# Patient Record
Sex: FEMALE | Race: WHITE | NOT HISPANIC OR LATINO
[De-identification: names, ages, dates, MRNs, and addresses within clinical notes are randomized per-mention and may not be internally consistent; named-entity substitution may affect disease eponyms.]

---

## 2022-05-17 PROBLEM — Z00.00 ENCOUNTER FOR PREVENTIVE HEALTH EXAMINATION: Status: ACTIVE | Noted: 2022-05-17

## 2022-05-23 ENCOUNTER — NON-APPOINTMENT (OUTPATIENT)
Age: 47
End: 2022-05-23

## 2022-09-19 ENCOUNTER — NON-APPOINTMENT (OUTPATIENT)
Age: 47
End: 2022-09-19

## 2022-09-19 ENCOUNTER — APPOINTMENT (OUTPATIENT)
Dept: OBGYN | Facility: CLINIC | Age: 47
End: 2022-09-19

## 2022-09-19 VITALS
SYSTOLIC BLOOD PRESSURE: 120 MMHG | DIASTOLIC BLOOD PRESSURE: 89 MMHG | BODY MASS INDEX: 26.68 KG/M2 | HEART RATE: 75 BPM | WEIGHT: 170 LBS | HEIGHT: 67 IN

## 2022-09-19 DIAGNOSIS — Z01.419 ENCOUNTER FOR GYNECOLOGICAL EXAMINATION (GENERAL) (ROUTINE) W/OUT ABNORMAL FINDINGS: ICD-10-CM

## 2022-09-19 PROCEDURE — 76830 TRANSVAGINAL US NON-OB: CPT

## 2022-09-19 PROCEDURE — 99396 PREV VISIT EST AGE 40-64: CPT | Mod: 25

## 2022-09-19 NOTE — PROCEDURE
[Transvaginal Ultrasound] : transvaginal ultrasound [FreeTextEntry3] : CERVIX NORMAL\par NO FREE FLUID [FreeTextEntry5] : 122 CC VOL,   8MM.. [id number] CC VOL 3 CM [FreeTextEntry7] : 2.0 CC [FreeTextEntry8] : 1.3 CC

## 2022-09-19 NOTE — HISTORY OF PRESENT ILLNESS
[FreeTextEntry1] : here for ANNUAL\par \par LAST KENIA VISIT:\par \par \par    	Print\par Patient\par Name	TESSY BRIONES (46yo, F) ID# [id number]	Appt. Date/Time	2020 10:00AM\par 	1975	Service Dept.	[facility] OFFICE\par Provider	ELISA RODARTE MD\par Insurance	\par Med Primary: [facility] - PCP ONLY (MEDICAID REPLACEMENT - HMO)\par Insurance # : [id number]\par Prescription: [facility] - Member is eligible. details\par Chief Complaint\par Well Woman Visit\par \par MENORRHAGIA\par LCIS\par ANEMIA\par Patient's Care Team\par Primary Care Provider: ALTAGRACIA MARSHALL MD: [address] [place] [address], Ph [phone], Fax [phone] NPI: [id number]\par Patient's Pharmacies\par [facility] ([facility]): [address] [place] [address], Ph [phone], Fax [phone]\par Vitals\par 2020 10:27 am\par Ht:	5 ft 7 in\par Wt:	181 lbs\par BMI:	28.3\par BP:	120/74 sitting\par Allergies\par Reviewed Allergies\par ALEVE: - possibly rash	\par Medications\par Reviewed Medications\par amLODIPine 5 mg tablet\par 16   filled	[facility]\par amoxicillin 500 mg capsule\par 18   filled	[facility]\par amoxicillin 875 mg-potassium clavulanate 125 mg tablet\par 03/30/15   filled	[facility]\par azithromycin 250 mg tablet\par 12/28/15   filled	[facility]\par benzoyl peroxide 10 % topical cleanser\par 16   filled	[facility]\par ciprofloxacin 500 mg tablet\par Take 1 tablet(s) every 12 hours by oral route for 7 days.\par 14   filled	[facility]\par Cleocin 100 mg vaginal suppository\par Insert 1 suppositor(y/ies) every day by vaginal route at bedtime for 3 days.\par 16   filled	[facility]\par clindamycin  mg capsule\par 16   filled	[facility]\par clindamycin phosphate 1 % topical solution\par 18   filled	[facility]\par cyclobenzaprine 10 mg tablet\par 19   filled	[facility]\par fluconazole 150 mg tablet\par Take 1 tablet(s) by oral route for 1 day.\par 10/13/15   filled	[facility]\par halobetasol propionate 0.05 % topical cream\par 18   filled	[facility]\par halobetasol propionate 0.05 % topical ointment\par 18   filled	[facility]\par hydrOXYzine HCL 10 mg tablet\par 18   filled	[facility]\par hydrOXYzine HCL 25 mg tablet\par 10/22/18   filled	[facility]\par ibuprofen 600 mg tablet\par 18   filled	[facility]\par ibuprofen 800 mg tablet\par 19   filled	[facility]\par ketoconazole 2 % topical cream\par 10/09/18   filled	[facility]\par lorata-dine D 10 mg-240 mg tablet,extended release\par 20   filled	[facility]\par loratadine 10 mg tablet\par 19   filled	[facility]\par minocycline 100 mg capsule\par 16   filled	[facility]\par mometasone 0.1 % topical cream\par 10/22/18   filled	[facility]\par montelukast 10 mg tablet\par 20   filled	[facility]\par nitrofurantoin macrocrystaL 100 mg capsule\par 17   filled	[facility]\par promethazine 6.25 mg/5 mL oral syrup\par 01/07/15   filled	[facility]\par SSD 1 % topical cream\par 08/14/15   filled	[facility]\par Tamiflu 75 mg capsule\par 12/28/15   filled	[facility]\par tobramycin 0.3 %-dexamethasone 0.1 % eye drops,suspension\par 16   filled	[facility]\par triamcinolone acetonide 0.1 % topical cream\par 18   filled	[facility]\par \par claritin D and Flonase\par Problems\par Reviewed Problems\par 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 a day(socially))\par Most Recent Tobacco Use Screenin2020\par Surgical History\par Surgical History not reviewed (last reviewed 2019)\par Appendectomy\par Breast Biopsy - X2\par Tonsillectomy\par GYN History\par Reviewed GYN History\par Duration of Flow (days): 6.\par LMP: Definite.\par Frequency of Cycle (Q days): 28.\par Menses Monthly: Y.\par Flow: Heavy (Notes: CLOTS).\par Date of LMP: 2020.\par Obstetric History\par Reviewed Obstetric History\par TOTAL	FULL	PRE	AB. I	AB. S	ECTOPICS	MULTIPLE	LIVING\par 0							\par DOES NOT WANT KIDS\par Past Medical History\par Past Medical History not reviewed (last reviewed 2019)\par Anemia: Y - IRON DEF\par Notes: LCIS\par Screening\par None recorded.\par HPI\par routine gyn exam/MENORRHAGIA\par \par ROS\par Patient reports abnormal bleeding but reports no hematuria, no flank pain, no trouble urinating, no incontinence, no rash, no lesion, no discharge, no vaginal odor, and no vaginal itching. She reports no fatigue, no fever, no significant weight gain, and no significant weight loss. She reports no abnormal moles and no rashes. She reports no irritation and no vision changes. She reports no hearing loss, no ear pain, no nose/sinus problems, no sore throat, no snoring, no dry mouth, and no mouth ulcers. She reports no dyspnea / shortness of breath, no cough, no sputum production, no hemoptysis, and no wheezing. She reports no chest pain, no palpitations, and no orthopnea. She reports no heartburn, no dysphagia, no nausea, no vomiting, no abdominal pain, no bowel movement changes, no diarrhea, no constipation, and no rectal bleeding. She reports no menstrual problems and no PMDD symptoms. She reports no menopausal symptoms. She reports no sexual problems. She reports no muscle aches, no muscle weakness, no arthralgias/joint pain, and no back pain. She reports no headaches, no dizziness, no LOC, no weakness, no numbness, and no seizures. She reports no depression, no alcoholism, and no sleep disturbances.\par Physical Exam\par Patient is a 44-year-old female.\par \par Chaperone: Chaperone: present.\par \par Female Genitalia: Vulva: no masses, atrophy, or lesions. Bladder/Urethra: no urethral discharge or mass and normal meatus and bladder non distended. Vagina no tenderness, erythema, cystocele, rectocele, abnormal vaginal discharge, or vesicle(s) or ulcers. Cervix: no discharge or cervical motion tenderness and grossly normal. Uterus: midline, mobile, non-tender, normal shape, no uterine prolapse, and enlarged. Adnexa/Parametria: no parametrial tenderness or mass and no adnexal tenderness or ovarian mass.\par \par Breast: Inspection/Palpation: no erythema, induration, tenderness, skin changes, abnormal secretions, or distinct masses and normal nipple appearance and non tender axillary lymph nodes.\par \par Abdomen: Auscultation/Inspection/Palpation: no tenderness, hepatomegaly, splenomegaly, masses, or CVA tenderness and soft, non-distended, and normal bowel sounds. Hernia: none palpated.\par Assessment / Plan\par 1. Gynecologic examination -\par does not want children...re-discussed\par \par Z01.419: Encounter for gynecological examination (general) (routine) without abnormal findings\par PAP, LB\par MAMMO, SCREENING, DIGITAL, BILATERAL\par \par 2. Menorrhagia -\par clots x 2 days\par \par HAD IRON INFUSIONS, RECOMMENDED OFFICE D AND C HYSTEROSCOPY, MIRENA INSERTION\par \par PT TO CONSIDER IT AND CHECK WITH BREAST MD\par \par N92.0: Excessive and frequent menstruation with regular cycle\par US, TRANSVAGINAL\par \par 3. Iron deficiency anemia -\par OBSERVE\par \par D50.9: Iron deficiency anemia, unspecified\par \par 4. Lobular carcinoma in situ of breast -\par HAVING SURGERY NEXT WEEK\par \par D05.01: Lobular carcinoma in situ of right breast\par \par 5. Hypertrophy of uterus -\par OBSERVE\par \par N85.2: Hypertrophy of uterus\par \par US, TRANSVAGINAL\par \par Review of us, transvaginal taken on 2020 at [facility] OFFICE shows:\par Imaging Studies:\par Indications: abnormal bleeding.\par Uterus: volume (cc): 129.\par Endometrium: thickness 10.7 mm.\par Cervix: normal.\par Cul de sac: no fluid was demonstrated.\par Right Ovary: volume (cc): 5.3.\par Left Ovary: volume (cc): 8.4.\par \par Return to Office\par None recorded.\par Encounter Sign-Off\par Encounter signed-off by Elisa Rodarte MD, 2021.\par Encounter performed and documented by Elisa Rodarte MD\par Encounter reviewed & signed by Elisa Rodarte MD on 2021 at 4:07pm\par Audit history\par

## 2022-09-22 LAB — HPV HIGH+LOW RISK DNA PNL CVX: NOT DETECTED

## 2022-09-29 LAB — CYTOLOGY CVX/VAG DOC THIN PREP: ABNORMAL

## 2023-03-13 ENCOUNTER — APPOINTMENT (OUTPATIENT)
Dept: OBGYN | Facility: CLINIC | Age: 48
End: 2023-03-13
Payer: MEDICAID

## 2023-03-13 VITALS
HEIGHT: 67 IN | DIASTOLIC BLOOD PRESSURE: 88 MMHG | BODY MASS INDEX: 28.09 KG/M2 | WEIGHT: 179 LBS | SYSTOLIC BLOOD PRESSURE: 120 MMHG | HEART RATE: 76 BPM

## 2023-03-13 DIAGNOSIS — N85.2 HYPERTROPHY OF UTERUS: ICD-10-CM

## 2023-03-13 DIAGNOSIS — D50.9 IRON DEFICIENCY ANEMIA, UNSPECIFIED: ICD-10-CM

## 2023-03-13 DIAGNOSIS — D05.00 LOBULAR CARCINOMA IN SITU OF UNSPECIFIED BREAST: ICD-10-CM

## 2023-03-13 PROCEDURE — 99213 OFFICE O/P EST LOW 20 MIN: CPT | Mod: 25

## 2023-03-13 PROCEDURE — 76830 TRANSVAGINAL US NON-OB: CPT

## 2023-03-13 NOTE — PHYSICAL EXAM
[No Masses] : no breast masses were palpable [Labia Majora] : normal [Labia Minora] : normal [Normal] : normal [Enlarged ___ wks] : enlarged [unfilled] ~Uweeks [Uterine Adnexae] : normal [Chaperone Present] : A chaperone was present in the examining room during all aspects of the physical examination

## 2023-03-13 NOTE — HISTORY OF PRESENT ILLNESS
[Irregular Menstrual Interval] : irregular menstrual interval [Heavy Bleeding] : heavy bleeding [FreeTextEntry1] : hERE FOR fu of a new fibroid found\par periods have been wacky on and off,,,coming late, lasting 12 days...then back to normal

## 2023-10-30 ENCOUNTER — APPOINTMENT (OUTPATIENT)
Dept: OBGYN | Facility: CLINIC | Age: 48
End: 2023-10-30

## 2024-03-07 ENCOUNTER — TRANSCRIPTION ENCOUNTER (OUTPATIENT)
Age: 49
End: 2024-03-07

## 2024-07-16 ENCOUNTER — APPOINTMENT (OUTPATIENT)
Dept: OBGYN | Facility: CLINIC | Age: 49
End: 2024-07-16
Payer: MEDICAID

## 2024-07-16 VITALS
WEIGHT: 180 LBS | BODY MASS INDEX: 28.25 KG/M2 | DIASTOLIC BLOOD PRESSURE: 77 MMHG | SYSTOLIC BLOOD PRESSURE: 117 MMHG | HEART RATE: 74 BPM | HEIGHT: 67 IN

## 2024-07-16 DIAGNOSIS — N63.14 UNSPECIFIED LUMP IN THE RIGHT BREAST, LOWER INNER QUADRANT: ICD-10-CM

## 2024-07-16 DIAGNOSIS — Z87.891 PERSONAL HISTORY OF NICOTINE DEPENDENCE: ICD-10-CM

## 2024-07-16 DIAGNOSIS — Z12.39 ENCOUNTER FOR OTHER SCREENING FOR MALIGNANT NEOPLASM OF BREAST: ICD-10-CM

## 2024-07-16 DIAGNOSIS — Z80.3 FAMILY HISTORY OF MALIGNANT NEOPLASM OF BREAST: ICD-10-CM

## 2024-07-16 DIAGNOSIS — R82.998 OTHER ABNORMAL FINDINGS IN URINE: ICD-10-CM

## 2024-07-16 DIAGNOSIS — N92.6 IRREGULAR MENSTRUATION, UNSPECIFIED: ICD-10-CM

## 2024-07-16 DIAGNOSIS — N95.1 MENOPAUSAL AND FEMALE CLIMACTERIC STATES: ICD-10-CM

## 2024-07-16 DIAGNOSIS — N85.2 HYPERTROPHY OF UTERUS: ICD-10-CM

## 2024-07-16 DIAGNOSIS — Z01.419 ENCOUNTER FOR GYNECOLOGICAL EXAMINATION (GENERAL) (ROUTINE) W/OUT ABNORMAL FINDINGS: ICD-10-CM

## 2024-07-16 LAB
BILIRUB UR QL STRIP: NORMAL
CLARITY UR: CLEAR
COLLECTION METHOD: NORMAL
GLUCOSE UR-MCNC: NORMAL
HCG UR QL: 0.2 EU/DL
HGB UR QL STRIP.AUTO: NORMAL
KETONES UR-MCNC: NORMAL
LEUKOCYTE ESTERASE UR QL STRIP: ABNORMAL
NITRITE UR QL STRIP: NORMAL
PH UR STRIP: 7.5
PROT UR STRIP-MCNC: NORMAL
SP GR UR STRIP: 1.01

## 2024-07-16 PROCEDURE — 99213 OFFICE O/P EST LOW 20 MIN: CPT | Mod: 25

## 2024-07-16 PROCEDURE — 76830 TRANSVAGINAL US NON-OB: CPT

## 2024-07-16 PROCEDURE — 99396 PREV VISIT EST AGE 40-64: CPT

## 2024-07-16 PROCEDURE — 99459 PELVIC EXAMINATION: CPT

## 2024-07-16 PROCEDURE — 81003 URINALYSIS AUTO W/O SCOPE: CPT | Mod: QW

## 2024-07-16 RX ORDER — FLUTICASONE PROPIONATE 50 MCG
50 SPRAY, SUSPENSION NASAL
Refills: 0 | Status: ACTIVE | COMMUNITY

## 2024-07-16 RX ORDER — CETIRIZINE HCL 10 MG
10 TABLET ORAL
Refills: 0 | Status: ACTIVE | COMMUNITY

## 2024-07-16 RX ORDER — MONTELUKAST SODIUM 10 MG/1
10 TABLET, FILM COATED ORAL
Refills: 0 | Status: ACTIVE | COMMUNITY

## 2024-07-18 LAB — BACTERIA UR CULT: NORMAL

## 2024-07-21 LAB
C TRACH RRNA SPEC QL NAA+PROBE: NOT DETECTED
HPV HIGH+LOW RISK DNA PNL CVX: NOT DETECTED
N GONORRHOEA RRNA SPEC QL NAA+PROBE: NOT DETECTED
SOURCE TP AMPLIFICATION: NORMAL

## 2024-07-22 ENCOUNTER — NON-APPOINTMENT (OUTPATIENT)
Age: 49
End: 2024-07-22

## 2024-07-22 ENCOUNTER — APPOINTMENT (OUTPATIENT)
Dept: OBGYN | Facility: CLINIC | Age: 49
End: 2024-07-22

## 2024-07-22 NOTE — REASON FOR VISIT
[New Patient Visit] : a new patient visit [Referred By: _________] : Patient was referred by ADRIANA

## 2024-07-23 ENCOUNTER — APPOINTMENT (OUTPATIENT)
Dept: NEUROSURGERY | Facility: CLINIC | Age: 49
End: 2024-07-23
Payer: MEDICAID

## 2024-07-23 ENCOUNTER — NON-APPOINTMENT (OUTPATIENT)
Age: 49
End: 2024-07-23

## 2024-07-23 VITALS
SYSTOLIC BLOOD PRESSURE: 121 MMHG | BODY MASS INDEX: 28.25 KG/M2 | DIASTOLIC BLOOD PRESSURE: 81 MMHG | HEART RATE: 67 BPM | HEIGHT: 67 IN | OXYGEN SATURATION: 98 % | WEIGHT: 180 LBS

## 2024-07-23 DIAGNOSIS — Z87.09 PERSONAL HISTORY OF OTHER DISEASES OF THE RESPIRATORY SYSTEM: ICD-10-CM

## 2024-07-23 DIAGNOSIS — Z77.22 CONTACT WITH AND (SUSPECTED) EXPOSURE TO ENVIRONMENTAL TOBACCO SMOKE (ACUTE) (CHRONIC): ICD-10-CM

## 2024-07-23 DIAGNOSIS — I67.1 CEREBRAL ANEURYSM, NONRUPTURED: ICD-10-CM

## 2024-07-23 DIAGNOSIS — Z82.49 FAMILY HISTORY OF ISCHEMIC HEART DISEASE AND OTHER DISEASES OF THE CIRCULATORY SYSTEM: ICD-10-CM

## 2024-07-23 DIAGNOSIS — Z86.2 PERSONAL HISTORY OF DISEASES OF THE BLOOD AND BLOOD-FORMING ORGANS AND CERTAIN DISORDERS INVOLVING THE IMMUNE MECHANISM: ICD-10-CM

## 2024-07-23 PROCEDURE — 99204 OFFICE O/P NEW MOD 45 MIN: CPT

## 2024-07-23 NOTE — ASSESSMENT
[FreeTextEntry1] : IMPRESSION: 48F with PMH of iron deficiency anemia, migraines. Referred by ophtho for concern for R CC fistula on CTA head/neck 7/21/24 at [facility]. p/w R eye lid lag since April 2024, worsening headache/retroorbital pain on the R over last few weeks. No past or recent history of head trauma.   Patient with mild ptosis on exam that corrects with effort. No proptosis, ophthalmoplegia, or chemosis/injection. She states her R eye is occasionally red, but not visualized on exam today. She has visual asymmetry chronically due to astigmatism, but no worsening in visual acuity recently. Clinically, if there is a CCF, it would be a low grade, low flow fistula.   The ophthalmologist reports slight increased IOP in the R eye, mild corkscrewing of the conj vessels and "blood in schlemms canal suggesting increased episcleral venous pressure" on exam which raised concern for CCF. CTA shows a mild degree of contrast opacification of the R cavernous sinus in the posterior portion (not the entire sinus).   Concern for CCF both clinically and radiographically is high enough to warrant DSA to definitively diagnose. Discussed with patient that depending on findings, treatment may be warranted likely through transvenous embolization. Based on clinical exam today, the angio is not emergent but should get done within next few weeks.   The risks, benefits, alternatives, complications and personnel associated were discussed with the patient in great detail. She requests that we proceed.    PLAN: Schedule Cerebral Angiogram at [facility] Pre-surgical testing Will fax office note/consult letter to referring physician Dr. Deepak Garces

## 2024-07-23 NOTE — CONSULT LETTER
[Dear  ___] : Dear  [unfilled], [( Thank you for referring [unfilled] for consultation for _____ )] : Thank you for referring [unfilled] for consultation for [unfilled] [Please see my note below.] : Please see my note below. [Consult Closing:] : Thank you very much for allowing me to participate in the care of this patient.  If you have any questions, please do not hesitate to contact me. [Sincerely,] : Sincerely, [FreeTextEntry1] : We are arranging a diagnostic cerebral angiogram for Monday, July 29.  [FreeTextEntry3] : Adelfo Michaud MD Email: [email or web address] Cell: [phone]

## 2024-07-23 NOTE — DATA REVIEWED
[de-identified] : CT scan orbits [de-identified] : CTA head and neck w/wo IV contrast 7/21/24 - Jose Alejandro

## 2024-07-23 NOTE — HISTORY OF PRESENT ILLNESS
[FreeTextEntry1] : CC fistula [de-identified] : TESSY ELLIOTT is a 48 year old female with PMH of iron deficiency anemia, migraines. Per patient, she was seen by regular eye doctor who saw blood spots, referred to retina specialist, and then sent to Dr. Deepak Garces who noticed eye pressure in R eye higher than the left, prescribed eye drops. On April 30, 2024, patient was taking picture for her 's license and noticed in the picture, her right eye was lid lagging, droopy, half open, not as open as the left eye. Denies eye pain at the time. Followed up with Dr. Garces for interval follow up, was sent for work-up with CT scan of the orbits, sella, optic nerve at [facility] did not improve brain imaging.  Report for CT scan of orbits sent to Dr. Garces, inconclusive, sent patient to ER for brain imaging. 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 Referred here for neurosurgical evaluation and further investigation. She works as a . Denies past or recent history of head trauma.   Today, she presents for neurosurgical consultation. Reports right eye pain and migraine headaches associated with it in the last few weeks. Her right eye lid lag is about the same since she first noticed it in April. Denies other symptoms of motor, sensory, speech, or visual abnormalities.

## 2024-07-23 NOTE — DATA REVIEWED
[de-identified] : CT scan orbits [de-identified] : CTA head and neck w/wo IV contrast 7/21/24 - Jose Alejandro

## 2024-07-23 NOTE — ASSESSMENT
[FreeTextEntry1] : IMPRESSION: 48F with PMH of iron deficiency anemia, migraines. Referred by ophtho for concern for R CC fistula on CTA head/neck 7/21/24 at [facility]. p/w R eye lid lag since April 2024, worsening headache/retroorbital pain on the R over last few weeks. No past or recent history of head trauma.   Patient with mild ptosis on exam that corrects with effort. No proptosis, ophthalmoplegia, or chemosis/injection. She states her R eye is occasionally red, but not visualized on exam today. She has visual asymmetry chronically due to astigmatism, but no worsening in visual acuity recently. Clinically, if there is a CCF, it would be a low grade, low flow fistula.   The ophthalmologist reports slight increased IOP in the R eye, mild corkscrewing of the conj vessels and "blood in schlemms canal suggesting increased episcleral venous pressure" on exam which raised concern for CCF. CTA shows a mild degree of contrast opacification of the R cavernous sinus in the posterior portion (not the entire sinus).   Concern for CCF both clinically and radiographically is high enough to warrant DSA to definitively diagnose. Discussed with patient that depending on findings, treatment may be warranted likely through transvenous embolization. Based on clinical exam today, the angio is not emergent but should get done within next few weeks.   The risks, benefits, alternatives, complications and personnel associated were discussed with the patient in great detail. She requests that we proceed.    PLAN: Schedule Cerebral Angiogram at [facility] Pre-surgical testing Will fax office note/consult letter to referring physician Dr. Deepak Garces

## 2024-07-23 NOTE — CONSULT LETTER
[Dear  ___] : Dear  [unfilled], [( Thank you for referring [unfilled] for consultation for _____ )] : Thank you for referring [unfilled] for consultation for [unfilled] [Please see my note below.] : Please see my note below. [Consult Closing:] : Thank you very much for allowing me to participate in the care of this patient.  If you have any questions, please do not hesitate to contact me. [Sincerely,] : Sincerely, [FreeTextEntry1] : We are arranging a diagnostic cerebral angiogram for Monday, July 29.  [FreeTextEntry3] : Adelfo Michaud MD Email: [email or web address] Cell: [phone]

## 2024-07-23 NOTE — HISTORY OF PRESENT ILLNESS
[FreeTextEntry1] : CC fistula [de-identified] : TESSY ELLIOTT is a 48 year old female with PMH of iron deficiency anemia, migraines. Per patient, she was seen by regular eye doctor who saw blood spots, referred to retina specialist, and then sent to Dr. Deepak Garces who noticed eye pressure in R eye higher than the left, prescribed eye drops. On April 30, 2024, patient was taking picture for her 's license and noticed in the picture, her right eye was lid lagging, droopy, half open, not as open as the left eye. Denies eye pain at the time. Followed up with Dr. Garces for interval follow up, was sent for work-up with CT scan of the orbits, sella, optic nerve at [facility] Insurance did not improve brain imaging.  Report for CT scan of orbits sent to Dr. Garces, inconclusive, sent patient to ER for brain imaging. 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 Referred here for neurosurgical evaluation and further investigation. She works as a . Denies past or recent history of head trauma.   Today, she presents for neurosurgical consultation. Reports right eye pain and migraine headaches associated with it in the last few weeks. Her right eye lid lag is about the same since she first noticed it in April. Denies other symptoms of motor, sensory, speech, or visual abnormalities.

## 2024-07-27 LAB — CYTOLOGY CVX/VAG DOC THIN PREP: NORMAL

## 2024-07-29 ENCOUNTER — TRANSCRIPTION ENCOUNTER (OUTPATIENT)
Age: 49
End: 2024-07-29

## 2024-07-29 ENCOUNTER — OUTPATIENT (OUTPATIENT)
Dept: OUTPATIENT SERVICES | Facility: HOSPITAL | Age: 49
LOS: 1 days | End: 2024-07-29
Payer: MEDICAID

## 2024-07-29 ENCOUNTER — APPOINTMENT (OUTPATIENT)
Dept: NEUROSURGERY | Facility: HOSPITAL | Age: 49
End: 2024-07-29

## 2024-07-29 VITALS
DIASTOLIC BLOOD PRESSURE: 79 MMHG | OXYGEN SATURATION: 100 % | RESPIRATION RATE: 13 BRPM | HEART RATE: 55 BPM | SYSTOLIC BLOOD PRESSURE: 113 MMHG

## 2024-07-29 VITALS — WEIGHT: 177.91 LBS | HEIGHT: 67 IN

## 2024-07-29 DIAGNOSIS — Z90.89 ACQUIRED ABSENCE OF OTHER ORGANS: Chronic | ICD-10-CM

## 2024-07-29 DIAGNOSIS — Z90.49 ACQUIRED ABSENCE OF OTHER SPECIFIED PARTS OF DIGESTIVE TRACT: Chronic | ICD-10-CM

## 2024-07-29 DIAGNOSIS — Z03.89 ENCOUNTER FOR OBSERVATION FOR OTHER SUSPECTED DISEASES AND CONDITIONS RULED OUT: ICD-10-CM

## 2024-07-29 DIAGNOSIS — I67.1 CEREBRAL ANEURYSM, NONRUPTURED: ICD-10-CM

## 2024-07-29 DIAGNOSIS — Z98.890 OTHER SPECIFIED POSTPROCEDURAL STATES: Chronic | ICD-10-CM

## 2024-07-29 DIAGNOSIS — Z90.10 ACQUIRED ABSENCE OF UNSPECIFIED BREAST AND NIPPLE: ICD-10-CM

## 2024-07-29 PROCEDURE — 36227 PLACE CATH XTRNL CAROTID: CPT

## 2024-07-29 PROCEDURE — C1894: CPT

## 2024-07-29 PROCEDURE — 36223 PLACE CATH CAROTID/INOM ART: CPT | Mod: 59

## 2024-07-29 PROCEDURE — 36226 PLACE CATH VERTEBRAL ART: CPT

## 2024-07-29 PROCEDURE — 76937 US GUIDE VASCULAR ACCESS: CPT

## 2024-07-29 PROCEDURE — 76937 US GUIDE VASCULAR ACCESS: CPT | Mod: 26

## 2024-07-29 PROCEDURE — C1769: CPT

## 2024-07-29 PROCEDURE — 36226 PLACE CATH VERTEBRAL ART: CPT | Mod: RT

## 2024-07-29 PROCEDURE — C1889: CPT

## 2024-07-29 PROCEDURE — 36223 PLACE CATH CAROTID/INOM ART: CPT | Mod: 59,LT

## 2024-07-29 PROCEDURE — 36224 PLACE CATH CAROTD ART: CPT | Mod: RT

## 2024-07-29 PROCEDURE — C1887: CPT

## 2024-07-29 PROCEDURE — 36227 PLACE CATH XTRNL CAROTID: CPT | Mod: RT

## 2024-07-29 PROCEDURE — 36224 PLACE CATH CAROTD ART: CPT

## 2024-07-29 RX ORDER — DEXTROSE MONOHYDRATE, SODIUM CHLORIDE, SODIUM LACTATE, CALCIUM CHLORIDE, MAGNESIUM CHLORIDE 1.5; 538; 448; 18.4; 5.08 G/100ML; MG/100ML; MG/100ML; MG/100ML; MG/100ML
1000 SOLUTION INTRAPERITONEAL
Refills: 0 | Status: DISCONTINUED | OUTPATIENT
Start: 2024-07-29 | End: 2024-08-12

## 2024-07-29 NOTE — CHART NOTE - NSCHARTNOTEFT_GEN_A_CORE
Interventional Neuro Radiology  Pre-Procedure Note    This is a 47 y/o M with PMHx significant for Migraine headache, Anemia (Hx of Iron infusions in the past), Asthma, Mild (uses singulair), Breast Mass (LCIS) s/p removal, reports she was being monitored by opthalmology due to increased pressure in right eye, right eye lid drooping. Per chart review she had CTA head/neck w/wo IV contrast 7/21/24 in [facility] ED, which showed concern for "cavernous carotid fistula" and she was referred for Neurosx evaluation.    Neuro Exam: Awake and alert, oriented x3, fluent, Ketan, right eye droopy     PAST MEDICAL & SURGICAL HISTORY:  Anemia  Increased pressure in the eye  Breast mass  Lobular carcinoma in situ (LCIS) of right breast  Asthma  Carotid-cavernous fistula  History of tonsillectomy  S/P appendectomy  H/O breast surgery    Social History:   Denies tobacco use    FAMILY HISTORY:  No pertinent family history    Allergies:   Anchovies (Other)  No Known Drug Allergies    Current Medications:   · 	cetirizine 10 mg oral tablet: Last Dose Taken:  , 1 tab(s) orally once a day  · 	timolol maleate 0.25% ophthalmic solution: Last Dose Taken:  , 1 drop(s) in each affected eye once a day  · 	Flonase 50 mcg/inh nasal spray: Last Dose Taken:  , 1 spray(s) in each nostril once a day  · 	Singulair 10 mg oral tablet: Last Dose Taken:  , 1 tab(s) orally once a day  · 	Simbrinza 1%- 0.2% ophthalmic suspension: Last Dose Taken:  , 1 drop(s) in each affected eye once a day    Labs:                         10.1   4.67  )-----------( 308      ( 25 Jul 2024 19:37 )             34.5       07-25    140  |  102  |  10  ----------------------------<  102<H>  4.1   |  26  |  0.68      HCG: negative     Assessment/Plan:   This is a 47yo female  presents with possible CCF. Patient presents to neuro-IR for selective cerebral angiography. Procedure/ risks/ benefits/ goals/ alternatives were explained. Risks include but are not limited to stroke/ vessel injury/ hemorrhage/ groin hematoma. All questions answered. Informed content obtained from patient. Consent placed in chart.     Tori Duarte PA-C  [phone]

## 2024-07-29 NOTE — ASU DISCHARGE PLAN (ADULT/PEDIATRIC) - NS MD DC FALL RISK RISK
For information on Fall & Injury Prevention, visit: [email or web address] OR  [email or web address] OR  https://www.cdc.gov/steadi/patient.html

## 2024-07-29 NOTE — ASU DISCHARGE PLAN (ADULT/PEDIATRIC) - NURSING INSTRUCTIONS
Please feel free to contact us at [phone] if any problems arise. After 6PM, Monday through Friday, on weekends and on holidays, please call [phone] and ask for the radiology resident on call to be paged.

## 2024-07-29 NOTE — ASU DISCHARGE PLAN (ADULT/PEDIATRIC) - CARE PROVIDER_API CALL
Adelfo Mcihaud  Neurosurgery  [address], [address]  [place] [address]  Phone: [phone]  Fax: [phone]  Follow Up Time:

## 2024-07-29 NOTE — CHART NOTE - NSCHARTNOTEFT_GEN_A_CORE
Interventional Neuro- Radiology   Procedure Note    Procedure: Selective Cerebral Angiography   Pre- Procedure Diagnosis: CCF   Post- Procedure Diagnosis: CCF     : Dr. Jaswant MD  Fellow: Dr. Mi  Physician Assistant: Tori Duarte PA-C    RN: Roselyn   Tech:     Anesthesia: (MAC)      I/Os:  Fluids:  Klein: DTV   Contrast:  Estimated Blood Loss: <10cc      Preliminary Report:  Under MAC, using a ___Fr short/long sheath to the right femoral artery/ radial artery examination of left vertebral artery/ left internal carotid artery/ left external carotid artery/ right vertebral artery/ right internal carotid artery/ right external carotid artery via selective cerebral angiography demonstrates ________. ( Official note to follow).    Patient tolerated procedure well, vital signs stable, hemodynamically stable, no change in neurological status compared to baseline. Results discussed with patient and their family. Radial sheath d/c'ed, hemostasis maintained, no bleeding or hematoma, quickclot radial dressing placed at ___h. Patient transferred to IR recovery for further care/ monitoring.     Vascular access site:  Ultrasound guidance- yes  Fluoroscopy before procedure- yes   Fluoroscopy to confirm correct needle position after puncture and before advancing sheath- yes  Femoral artery angiography before sheath removal- yes  Closure device used- Vascade  Closure device appropriately deployed- yes  Manual pressure- held for 10minutes until hemostasis, no active bleeding or hematoma  Safeguard dressing applied- yes, applied at ___h. Interventional Neuro- Radiology   Procedure Note    Procedure: Selective Cerebral Angiography   Pre- Procedure Diagnosis: CCF     : Dr. Jaswant MD  Fellow: Dr. Aguilar   Physician Assistant: Tori Duarte PA-C    RN: Roselyn   Tech:  Thony/ Samra     Anesthesia: (MAC)  Dr. Chavis     I/Os:  Fluids: 400cc   Klein: DTV    Contrast: 78cc   Estimated Blood Loss: <10cc      Preliminary Report:  Under MAC, using a 5Fr short sheath to the right radial artery examination of left common carotid artery/ right vertebral artery/ right internal carotid artery/ right external carotid artery via selective cerebral angiography preformed. ( Official note to follow).    Patient tolerated procedure well, vital signs stable, hemodynamically stable, no change in neurological status compared to baseline. Results discussed with patient and their family. Radial sheath d/c'ed, hemostasis maintained, no bleeding or hematoma, quickclot radial dressing placed at 950h. Patient transferred to IR recovery for further care/ monitoring.     Vascular access site:  Ultrasound guidance- yes  Fluoroscopy before procedure- yes   Fluoroscopy to confirm correct needle position after puncture and before advancing sheath- yes  Manual pressure- held for 5minutes until hemostasis, no active bleeding or hematoma  Safeguard dressing applied- yes, applied at 950h.

## 2024-07-29 NOTE — ASU PATIENT PROFILE, ADULT - NS TRANSFER EYEGLASSES PAIRS
Patient read [employer] message  No annual exam scheduled   Call placed to patient  VM left on mobile #  Advised patient to call office back  Reception: If patient returns call, please schedule her for annual exam with Dr. Rollins   Medication can then be refilled until apt   1 pair

## 2024-07-29 NOTE — ASU PATIENT PROFILE, ADULT - NSICDXPASTMEDICALHX_GEN_ALL_CORE_FT
PAST MEDICAL HISTORY:  Anemia     Asthma     Breast mass     Carotid-cavernous fistula     Increased pressure in the eye     Lobular carcinoma in situ (LCIS) of right breast

## 2024-08-14 ENCOUNTER — NON-APPOINTMENT (OUTPATIENT)
Age: 49
End: 2024-08-14

## 2024-08-16 ENCOUNTER — NON-APPOINTMENT (OUTPATIENT)
Age: 49
End: 2024-08-16

## 2024-08-16 PROBLEM — R92.1 BREAST CALCIFICATION, RIGHT: Status: ACTIVE | Noted: 2024-08-16

## 2024-09-12 ENCOUNTER — NON-APPOINTMENT (OUTPATIENT)
Age: 49
End: 2024-09-12

## 2024-09-22 ENCOUNTER — NON-APPOINTMENT (OUTPATIENT)
Age: 49
End: 2024-09-22

## 2024-10-07 ENCOUNTER — NON-APPOINTMENT (OUTPATIENT)
Age: 49
End: 2024-10-07

## 2024-10-08 ENCOUNTER — APPOINTMENT (OUTPATIENT)
Dept: OBGYN | Facility: CLINIC | Age: 49
End: 2024-10-08
Payer: MEDICAID

## 2024-10-08 VITALS
DIASTOLIC BLOOD PRESSURE: 72 MMHG | SYSTOLIC BLOOD PRESSURE: 111 MMHG | BODY MASS INDEX: 29.41 KG/M2 | HEART RATE: 77 BPM | TEMPERATURE: 98.7 F | WEIGHT: 183 LBS | HEIGHT: 66 IN

## 2024-10-08 DIAGNOSIS — N95.0 POSTMENOPAUSAL BLEEDING: ICD-10-CM

## 2024-10-08 LAB
HCG UR QL: NEGATIVE
QUALITY CONTROL: YES

## 2024-10-08 PROCEDURE — 58558Z: CUSTOM

## 2024-10-08 PROCEDURE — 81025 URINE PREGNANCY TEST: CPT

## 2024-10-11 LAB — CORE LAB BIOPSY: NORMAL

## 2024-10-14 ENCOUNTER — NON-APPOINTMENT (OUTPATIENT)
Age: 49
End: 2024-10-14

## 2025-02-03 ENCOUNTER — APPOINTMENT (OUTPATIENT)
Dept: OBGYN | Facility: CLINIC | Age: 50
End: 2025-02-03
Payer: MEDICAID

## 2025-02-03 VITALS
WEIGHT: 178 LBS | DIASTOLIC BLOOD PRESSURE: 91 MMHG | SYSTOLIC BLOOD PRESSURE: 125 MMHG | BODY MASS INDEX: 27.94 KG/M2 | HEIGHT: 67 IN | HEART RATE: 91 BPM

## 2025-02-03 DIAGNOSIS — D05.00 LOBULAR CARCINOMA IN SITU OF UNSPECIFIED BREAST: ICD-10-CM

## 2025-02-03 DIAGNOSIS — Z85.3 PERSONAL HISTORY OF MALIGNANT NEOPLASM OF BREAST: ICD-10-CM

## 2025-02-03 DIAGNOSIS — N85.2 HYPERTROPHY OF UTERUS: ICD-10-CM

## 2025-02-03 DIAGNOSIS — N95.0 POSTMENOPAUSAL BLEEDING: ICD-10-CM

## 2025-02-03 DIAGNOSIS — R39.89 OTHER SYMPTOMS AND SIGNS INVOLVING THE GENITOURINARY SYSTEM: ICD-10-CM

## 2025-02-03 LAB
BILIRUB UR QL STRIP: NORMAL
CLARITY UR: CLEAR
COLLECTION METHOD: NORMAL
GLUCOSE UR-MCNC: NORMAL
HCG UR QL: 0.2 EU/DL
HGB UR QL STRIP.AUTO: NORMAL
KETONES UR-MCNC: NORMAL
LEUKOCYTE ESTERASE UR QL STRIP: ABNORMAL
NITRITE UR QL STRIP: NORMAL
PH UR STRIP: 7
PROT UR STRIP-MCNC: NORMAL
SP GR UR STRIP: 1.02

## 2025-02-03 PROCEDURE — 81003 URINALYSIS AUTO W/O SCOPE: CPT | Mod: QW

## 2025-02-03 PROCEDURE — 76830 TRANSVAGINAL US NON-OB: CPT

## 2025-02-03 PROCEDURE — 99203 OFFICE O/P NEW LOW 30 MIN: CPT | Mod: 25

## 2025-02-03 PROCEDURE — 99459 PELVIC EXAMINATION: CPT

## 2025-02-08 LAB — BACTERIA UR CULT: NORMAL
